# Patient Record
(demographics unavailable — no encounter records)

---

## 2017-10-01 NOTE — RAD
HISTORY: Back pain



COMPARISONS: None



VIEWS: 5 , Frontal, lateral, coned-down lateral sacral, and bilateral oblique views of the

lumbar spine.



FINDINGS: 



ALIGNMENT:  There is mild dextroscoliotic curvature of the spine. There is straightening

of the lumbar lordosis.

VERTEBRAL BODIES:  The vertebral body heights are normal. The interpedicular distances are

normal. There is multilevel anterolateral marginal osteophyte formation.

JOINTS:  There is diffuse facet osteoarthritic change, most pronounced along the lower

lumbar spine

INTERVERTEBRAL DISCS:  There is diffuse loss of intervertebral disc height.

SOFT TISSUE: Unremarkable.



OTHER:  The pelvis is unremarkable. The lung bases are clear.



IMPRESSION: 

DEGENERATIVE DISC DISEASE AND OSTEOARTHRITIS.

## 2017-10-01 NOTE — ED
Alfred FUCHS Thomas, scribed for Mary Jane Yepez MD on 10/01/17 at 0947 .





Back Pain





- HPI Summary


HPI Summary: 





The pt is a 66 y/o M presenting to the ED c/o sharp left lower back pain that 

began 9 days ago when he was doing mulch. The pain is described as a tightness, 

stiffness, and soreness. The pt rates the pain 2/10. The pain is alleviated by 

movement. The pain is aggravated by nothing. The patient has treated the pain 

with Alleve PTA. Two days ago, the pain worsened and the patient had difficulty 

standing up and ambulating. The pain now radiates down his LLE below his calf. 

He saw his PMD who thought that the patient aggravated a tendon in his pelvic 

bone. He prescribed the patient Naproxen 500mg BID. He reports previous back 

injuries. Two years ago, he had some sort of imaging on his back. Pt denies 

bladder or bowel incontinence. He is accompanied by his wife. PMHx of HTN, HLD, 

and hypothyroidism. He is on Simvastatin 20mg, Lisinopril 20mg, Synthroid 50mg, 

and ASA 81. He also has a Hx of kidney stones, although he says his current 

symptoms does not align with his prior sxs with kidney stones. PSHx of knee 

repairs and ESWL. FHx of cancer and Parkinsons disease. 





- History of Current Complaint


Chief Complaint: EDBackInjuryPain


Stated Complaint: BACK PAIN


Time Seen by Provider: 10/01/17 06:28


Hx Obtained From: Patient, Family/Caretaker - accompanied by his wife


Onset/Duration: Lasting Days - onset of pain 9 days ago, Still Present, Worse 

Since - worse since two days ago


Onset/Duration: Started Days Ago, Atraumatic, Still Present


Timing: Constant


Back Pain Location: Is Discrete @ - left lower back


Severity Currently: Moderate


Pain Intensity: 2


Pain Scale Used: 0-10 Numeric


Character: Sharp


Aggravating Symptom(s): Movement


Alleviating Symptom(s): Nothing


Associated Signs And Symptoms: Negative: Bladder Incontinence, Bowel 

Incontinence


Related History: Previous Back Injury





- Allergies/Home Medications


Allergies/Adverse Reactions: 


 Allergies











Allergy/AdvReac Type Severity Reaction Status Date / Time


 


No Known Allergies Allergy   Verified 05/13/16 08:09














PMH/Surg Hx/FS Hx/Imm Hx


Previously Healthy: No


Endocrine/Hematology History: Reports: Hx Thyroid Disease


   Denies: Hx Diabetes


Cardiovascular History: Reports: Hx Hypercholesterolemia, Hx Hypertension - ON 

MEDS


   Denies: Hx Pacemaker/ICD


 History: 


   Denies: Hx Renal Disease


Sensory History: 


   Denies: Hx Hearing Aid


Psychiatric History: 


   Denies: Hx Panic Disorder





- Surgical History


Surgery Procedure, Year, and Place: ARTHROSCOPIC RIGHT KNEE-REMOVED CYST 1991.  

KIDNEY STONES REMOVED SEVERAL TIMES.  TONSILS AS CHILD-BROKEN LEG AS CHILD NO 

METAL


Infectious Disease History: No


Infectious Disease History: 


   Denies: Traveled Outside the [place] in Last 30 Days





- Family History


Known Family History: Positive: Other - [place], parkinson's disease





- Social History


Lives: With Family


Alcohol Use: None


Hx Substance Use: No


Substance Use Type: Reports: None


Smoking Status (MU): Unknown if Ever Smoked





Review of Systems


Negative: Fever


Cardiovascular: Negative


Respiratory: Negative


Gastrointestinal: Negative


Positive: no symptoms reported


Positive: Other - Sharp left lower back pain onset 9 days ago


Skin: Negative


Neurological: Other - NEGATIVE: bladder or bowel incontinence


Psychological: Normal


All Other Systems Reviewed And Are Negative: Yes





Physical Exam


Triage Information Reviewed: Yes


Vital Signs On Initial Exam: 


 Initial Vitals











Temp Pulse Resp BP Pulse Ox


 


 97.2 F   104   20   143/94   98 


 


 10/01/17 03:10  10/01/17 03:10  10/01/17 03:10  10/01/17 03:10  10/01/17 03:10











Vital Signs Reviewed: Yes


Appearance: Positive: Well-Appearing, Well-Nourished, Pain Distress


Skin: Positive: Warm, Skin Color Reflects Adequate Perfusion


Head/Face: Positive: Normal Head/Face Inspection


Eyes: Positive: Conjunctiva Clear


ENT: Positive: Normal ENT inspection


Neck: Positive: Supple


Respiratory/Lung Sounds: Positive: Clear to Auscultation, Breath Sounds Present

, Other - No respiratory distress


Cardiovascular: Positive: RRR, Pulses are Symmetrical in both Upper and Lower 

Extremities, Other - Brisk cap refill.  Negative: Murmur


Abdomen Description: Positive: Nontender, No Organomegaly, Soft.  Negative: CVA 

Tenderness (R), CVA Tenderness (L), Distended, Guarding, Pulsatile Mass


Bowel Sounds: Positive: Present


Male Genital Exam: Positive: normal genitalia


Musculoskeletal: Positive: Strength/ROM Intact, Other - He is tender in L4, L5 

down to the coccyx. Pain is reproducible on the sciatic notch and sacroilium.


Neurological: Positive: Sensory/Motor Intact, Alert, Oriented to Person Place, 

Time, Reflexes Intact, Facial Symmetry, Speech Normal, Other - Able to walk on 

heels and toes


Psychiatric: Positive: Normal





Diagnostics





- Vital Signs


 Vital Signs











  Temp Pulse Resp BP Pulse Ox


 


 10/01/17 08:49  97.9 F    


 


 10/01/17 03:10  97.2 F  104  20  143/94  98














- Laboratory


Lab Statement: Any lab studies that have been ordered have been reviewed, and 

results considered in the medical decision making process.





- Radiology


  ** L-Spine XR


Xray Interpretation: No Acute Changes - DDD and OA. ED physician has reviewed 

this report and agrees.


Radiology Interpretation Completed By: Radiologist





Re-Evaluation





- Re-Evaluation


  ** Second Eval


Re-Evaluation Time: 08:30 - reports relief of pain.  Able to bear weight.  

Wants to go home. 


Change: Improved





Back Pain Course/Dx





- Course


Course Of Treatment: High blood pressure noted.  Patients medications reviewed 

this visit.


Assessment/Plan: The pt is a 66 y/o M presenting to the ED c/o sharp left lower 

back pain that began 9 days ago when he was doing mulch. The pain radiates down 

his LLE below his calf.  Patients medication reviewed this visit. Blood 

pressure noted. In the ED course the patient was given Flexeril and Norco. XR L-

Spine shows DDD and OA. ED physician has reviewed this report and agrees.  

Patient will be discharged home with follow up by PCP. Pt is agreeable with 

this plan.





- Diagnoses


Differential Diagnosis/HQI/PQRI: Positive: Arthritis, Herniated Disc, Strain, 

Sprain


Provider Diagnoses: 


 Acute low back pain with left-sided sciatica, Hypertension, poor control








Discharge





- Discharge Plan


Condition: Stable


Disposition: HOME


Prescriptions: 


Cyclobenzaprine TAB* [Flexeril 10 MG TAB*] 10 mg PO TID #30 tab


HYDROcodone/ACETAMIN 5-325 MG* [Norco 5-325 TAB*] 1 tab PO Q4H PRN #18 tab MDD 6


 PRN Reason: Pain


Patient Education Materials:  Sciatica (ED), Lumbar Radiculopathy (ED)


Referrals: 


Dev Gutierrez MD [Primary Care Provider] - 





The documentation as recorded by the Alfred carrizales Thomas accurately reflects 

the service I personally performed and the decisions made by me, Mary Jane Yepez MD.

## 2017-10-04 NOTE — ED
Back Pain





- HPI Summary


HPI Summary: 





65 male presents to ED with complaints of lower left sided back pain that began 

2 weeks ago after hurting himself while lifting and doing mulch. Patient states 

pain hurt worse last Friday 9/29/17 and seen in ED on [place] 10/1. Patient was 

taking naproxen 500mg BID and additionally given hydrocodone and flexeril when 

seen on Sunday. States he has not had any relief and has been crawling around, 

unable to walk and straighten leg without pain. Comfortable when resting and 

having leg externally rotated. Worse with weight bearing and walking. States 

pain starts in left gluteal/low back region and travels down left leg. 

Described as sharp and shooting at its worst. States "it feels like a 

rubberband that needs to be stretched but can't". Denies bruising, swelling and 

obvious deformity. No additional trauma or injury, states when he woke up last 

Friday it just hurt worse when he went to step off bed. Unable to make it to 

his PCP appointment today due to being unable to move/walk. Denies saddle 

anesthesia, bladder/bowel incontinence. Does admit to having some weakness of 

left leg. No abdominal pain, chest pain or trouble breathing. Has HTN, high 

cholesterol and thyroid disease that he currently takes medication for. Had 

xrays at last visit sunday that were unremarkable for acute injury. 





- History of Current Complaint


Chief Complaint: EDBackInjuryPain


Stated Complaint: BACK INJURY


Time Seen by Provider: 10/04/17 19:36


Hx Obtained From: Patient


Onset/Duration: Sudden Onset, Lasting Weeks, Still Present, Worse Since


Onset/Duration: Started Weeks Ago, Traumatic


Timing: Constant - with weight bearing or straightening of left LE


Back Pain Location: Is Discrete @ - left lumbar back /glute down left LE


Severity Initially: Moderate


Severity Currently: Severe


Pain Intensity: 8


Pain Scale Used: 0-10 Numeric


Aggravating Symptom(s): Movement, Walking


Alleviating Symptom(s): Rest, Position


Associated Signs And Symptoms: Positive: Weakness, Pain with Weight Bearing.  

Negative: Swelling, Redness, Bruising, Numbness, Tingling, Abdominal Pain, 

Bladder Incontinence, Bowel Incontinence





- Risk Factors


AAA Risk Factors: Hypertension, Atherosclerosis


TAD Risk Factors: Hypertension


Cauda Equina Risk Factors: Lower Extremity Weakness - left side


Epidural Abscess Risk Factors: Negative





- Allergies/Home Medications


Allergies/Adverse Reactions: 


 Allergies











Allergy/AdvReac Type Severity Reaction Status Date / Time


 


No Known Allergies Allergy   Verified 10/04/17 21:33











Home Medications: 


 Home Medications





Aspirin [Aspirin 81 MG TAB] 81 mg PO QPM 10/05/17 [History Confirmed 10/05/17]


Levothyroxine TAB* [Synthroid TAB*] 50 mcg PO QPM 10/05/17 [History Confirmed 10

/05/17]


Lisinopril TAB* [Prinivil TAB*] 20 mg PO DAILY 10/05/17 [History Confirmed 10/05

/17]


Simvastatin [Zocor 40 MG (NF)] 20 mg PO QPM 10/05/17 [History Confirmed 10/05/17

]











PMH/Surg Hx/FS Hx/Imm Hx


Endocrine/Hematology History: Reports: Hx Thyroid Disease


   Denies: Hx Diabetes


Cardiovascular History: Reports: Hx Hypercholesterolemia, Hx Hypertension - ON 

MEDS


   Denies: Hx Pacemaker/ICD


 History: 


   Denies: Hx Renal Disease


Sensory History: 


   Denies: Hx Hearing Aid


Psychiatric History: 


   Denies: Hx Panic Disorder





- Surgical History


Surgery Procedure, Year, and Place: ARTHROSCOPIC RIGHT KNEE-REMOVED CYST 1991.  

KIDNEY STONES REMOVED SEVERAL TIMES.  TONSILS AS CHILD-BROKEN LEG AS CHILD NO 

METAL





- Immunization History


Immunizations Up to Date: Yes


Infectious Disease History: No


Infectious Disease History: 


   Denies: Traveled Outside the [place] in Last 30 Days





- Family History


Known Family History: Positive: Other - [place], parkinson's disease





- Social History


Alcohol Use: None


Hx Substance Use: No


Substance Use Type: Reports: None


Smoking Status (MU): Unknown if Ever Smoked





Review of Systems


Constitutional: Negative


Cardiovascular: Negative


Respiratory: Negative


Positive: Arthralgia - lumbar back , Myalgia, Decreased ROM - left LE


Skin: Negative


Positive: Weakness - left LE


All Other Systems Reviewed And Are Negative: Yes





Physical Exam


Triage Information Reviewed: Yes


Vital Signs On Initial Exam: 


 Initial Vitals











Temp Pulse Resp BP Pulse Ox


 


 98 F   75   20   156/85   98 


 


 10/04/17 20:00  10/04/17 20:00  10/04/17 20:00  10/04/17 20:00  10/04/17 20:00








BP noted, patient in pain and diagnosed with HTN. recommend follow up with PCP 

for recheck.


Vital Signs Reviewed: Yes


Appearance: Positive: Well-Appearing, No Pain Distress - when sitting 

comfortable on stretcher, Well-Nourished


Skin: Positive: Warm, Skin Color Reflects Adequate Perfusion, Dry.  Negative: 

Cold, Numb, Cyanosis @, Pale, Erythema @


Head/Face: Positive: Normal Head/Face Inspection


Eyes: Positive: Conjunctiva Clear


ENT: Positive: Hearing grossly normal


Neck: Positive: Supple, Nontender


Respiratory/Lung Sounds: Positive: Clear to Auscultation, Breath Sounds 

Present.  Negative: Rales, Rhonchi, Wheezes


Cardiovascular: Positive: Normal, RRR, Pulses are Symmetrical in both Upper and 

Lower Extremities - 2+ pedal b/l.  Negative: Murmur, Rub


Abdomen Description: Positive: Nontender, Soft


Bowel Sounds: Positive: Present


Musculoskeletal: Positive: Limited @ - left LE due to pain with extension and 

weight bearing, Pain @ - left lumbar paraspinal muscle/gluteal region down left 

leg with movement and weight bearing. + straight leg raise, Other - no crepitus 

step off or obvious deformity. unable to straighten left LE without 

excrutiating discomfort.  Negative: Edema Left, Edema Right


Neurological: Positive: Normal, Sensory/Motor Intact - normal, Alert, Oriented 

to Person Place, Time, Reflexes Intact, NV Bundle Intact Distally, Unable to 

Assess Gait - due to pain





- [place] Coma Scale


Coma Scale Total: 15





Diagnostics





- Vital Signs


 Vital Signs











  Temp Pulse Resp BP Pulse Ox


 


 10/04/17 20:00  98 F  75  20  156/85  98














- Laboratory


Lab Statement: Any lab studies that have been ordered have been reviewed, and 

results considered in the medical decision making process.





Re-Evaluation





- Re-Evaluation


  ** First Eval


Re-Evaluation Time: 00:00


Change: Improved - had some relief after medication administered. was still 

unable to ambulate. discussed option of admission.





Back Pain Course/Dx





- Course


Course Of Treatment: given toradol, oxycodone and dexamethasone to attempt 

relief. did not repeat any imaging. had some relief however was unable to 

ambulate and move without excrutiating pain. attempted to use crutches however 

was still unable. Spoke with Dr Freeman about observation for pain control and 

possible consult. Patient preferred admission at this time due to being unable 

to function at home and take care of himself. Unable to get to outpatien doctor'

s appointments. Dr Freeman consulted and agreed to admit at this time. Told 

oxycodone and steroid were sent to pharmacy. recommended to follow up with 

neurosurgery for further evaluation and imaging.  No concern for emergent 

etiology such as abscess or cauda equina syndrome at this time. Appears to be 

suffering from a sciatica +/- muscular/neuro injury.





- Diagnoses


Differential Diagnosis/HQI/PQRI: Positive: Herniated Disc, Strain, Sprain, 

Other - sciatica


Provider Diagnoses: 


 Back pain with left-sided sciatica








- Provider Notifications


Discussed Care Of Patient With: Dr Padron, Dr Freeman


Time Discussed With Above Provider: 00:00


Instructed by Provider To: Admit As Observation





Discharge





- Discharge Plan


Condition: Stable


Disposition: ADMITTED TO [facility]


Prescriptions: 


oxyCODONE TAB* [Roxycodone TAB 5 mg*] 5 mg PO Q6H PRN #21 tab MDD 3


 PRN Reason: Pain


predniSONE TAB* [Deltasone TAB*] 40 mg PO DAILY #10 tab


Patient Education Materials:  Sciatica (ED), Lumbar Radiculopathy (ED)


Referrals: 


Dev Gutierrez MD [Primary Care Provider] - 


Dread Zelaya MD [Medical Doctor] -

## 2017-10-05 NOTE — HP
ADMISSION HISTORY AND PHYSICAL:

 

DATE OF ADMISSION:  10/05/17

 

PRIMARY CARE PROVIDER:  Dr. Gutierrez.

 

HEALTHCARE PROXY:  His wife.

 

CODE STATUS:  Full.

 

SOURCE OF INFORMATION:  History obtained from interview with the patient and 
his wife.

 

RELIABILITY:  Good.

 

CHIEF COMPLAINT:  Back and leg pain.

 

HISTORY OF PRESENT ILLNESS:  This is a 65-year-old man who had been in his 
usual state of health until approximately 2 weeks prior to presentation noticed 
pain in his lower back while moving mulch from his truck.  He was seen by Dr. Lujan, Dr. Gutierrez' partner and was prescribed naproxen with some relief; however
, awoke approximately 1 week ago with increasing pain and tightness in his left 
leg.  He was seen in the emergency room on the 1st and was prescribed 
hydrocodone as well as Flexeril in addition to the continued naproxen.  He has 
had minimal relief and as noted he has been unable to leave the house and 
making to the doctor's appointment secondary to increased pain in his leg.  He 
notes that the pain feels like a tightness like there as a rubber band in his 
leg extending from his left hamstring down to his foot with minimal pain in his 
left lower back that is largely improved at the time of the injury.  He feels 
the pain is relieved if he keeps his left hip externally rotated with his leg, 
flexed at the knee.  He feels the pain is worse with a straighten leg, trying 
to lay flat or with any application of pressure-like standing up.  Because of 
the continued pain and the inability to use this leg, he proceeded to the 
emergency room today where he was evaluated and the hospitalist service was 
consulted for admission.

 

PAST MEDICAL HISTORY:  Hypertension, hypothyroidism, hyperlipidemia, cyst 
removed from his left knee, recurrent nephrolithiasis, and tonsillectomy.

 

HOME MEDICATIONS:

1.  Aspirin 81 mg daily.

2.  Levothyroxine 50 mcg daily.

3.  Lisinopril 20 mg daily.

4.  Simvastatin 20 mg daily.

 

ALLERGIES:  No known drug allergies.

 

FAMILY HISTORY:  Positive for Parkinson's disease.

 

SOCIAL HISTORY:  No alcohol, illicits, or tobacco.

 

REVIEW OF SYSTEMS:  Negative for asymmetric weakness, paresthesias, urinary 
incontinence or retention, or stool incontinence.

 

                               PHYSICAL EXAMINATION

 

GENERAL:  Sitting up in bed, interactive, pleasant, in no apparent distress.

 

VITAL SIGNS:  In the emergency room, blood pressure 156/85, heart rate 75, 
respiratory rate 20, T-max 98 Fahrenheit, 98% on room air.

 

HEENT:  Oropharynx is clear.  He has moist mucous membranes.  Sclerae are 
anicteric.

 

LUNGS:  Clear to auscultation.

 

HEART:  He has regular rate and rhythm.  No murmurs, rubs, or gallops.

 

ABDOMEN:  Soft, nontender, and nondistended.

 

EXTREMITIES:  Warm and well perfused.  He is sitting straight up with his left 
leg externally rotated at the hip and flexed to me.  He experiences pain with 
extension of his leg.  There is no tenderness to palpation in his leg, nowhere 
along the spine or paraspinal muscles.  Gait not assessed.

 

NEUROLOGIC:  He is alert and oriented x3.  His cranial nerves are intact. 
Sensation is intact throughout.  He has downgoing Babinski's.  Reflex is 
symmetric.

 

 ASSESSMENT AND PLAN:  This is a 65-year-old man experienced lower back pain 
approximately 2 weeks prior while lifting mulch, now presenting with increasing 
pain in his leg, limiting his ability to function at home, ambulate or leave 
the home will get to the doctor.

 

1.  Lower back/left leg pain.  On my examination, this seems less like lower 
back pain and more muscular, localizing to his hamstring potentially while 
improves with flexion.  I think he needs additional imaging and/or evaluation 
by orthopedics to confirm or deny this muscular injury.  I placed a 
consultation for orthopedics who can direct us towards additional imaging.  For 
now, we will continue with current regimen of pain medication as was used at 
home.  Physical Therapy consultation is also placed.

2.  Hypertension.  Continue home medications.

3.  Hypothyroidism.  Continue home medication.

4.  DVT prophylaxis.  Azeb.

 

[id number]/[facility] #: [id number]

[facility]

## 2017-10-05 NOTE — PN
Subjective


Date of Service: 10/05/17


Interval History: 





Patient seen and examined at bedside.  Patient states he was able to take a few 

steps with the walker with physical therapy.  The patient states it is 

difficult for him to lie flat.  He has not had a BM since Sunday.


Family History: Unchanged from Admission


Social History: Unchanged from Admission


Past Medical History: Unchanged from Admission





Objective


Active Medications: 








Hydrocodone Bitart/Acetaminophen (Norco 5-325 Tab*)  1 tab PO Q4H PRN


Aspirin (Aspirin Ec Low Dose*)  81 mg PO QPM SHAYLA


Atorvastatin Calcium (Lipitor*)  10 mg PO QPM SHAYLA


Cyclobenzaprine HCl (Flexeril Tab*)  10 mg PO TID SHAYLA


Docusate Sodium (Colace Cap*)  100 mg PO BID SHAYLA


Enoxaparin Sodium (Lovenox(*))  40 mg SUBCUT 0600 SHAYLA


Ibuprofen (Motrin Tab*)  600 mg PO Q8H SHAYLA


Levothyroxine Sodium (Synthroid Tab*)  50 mcg PO 0600 SHAYLA


Lisinopril (Prinivil Tab*)  20 mg PO DAILY SHAYLA


Magnesium Citrate (Citrate Of Magnesia*)  150 ml PO ONCE ONE


Magnesium Hydroxide (Milk Of Magnesia Liq*)  30 ml PO ONCE ONE


Ondansetron HCl (Zofran Inj*)  4 mg IV Q4H PRN


Polyethylene Glycol/Electrolytes (Miralax*)  17 gm PO DAILY SHAYLA


Senna (Senokot Tab*)  2 tab PO BEDTIME SHAYLA








 Vital Signs








  10/05/17 10/05/17 10/05/17





  07:40 11:02 11:03


 


Temperature 97.5 F  


 


Pulse Rate 108  


 


Respiratory 18 16 18





Rate   


 


Blood Pressure 136/93  





(mmHg)   


 


O2 Sat by Pulse 95  





Oximetry   











Oxygen Devices in Use Now: None


Appearance: sitting up in bed, NAD


Eyes: No Scleral Icterus - in


Ears/Nose/Mouth/Throat: NL Teeth, Lips, Gums


Neck: NL Appearance and Movements; NL JVP


Respiratory: Symmetrical Chest Expansion and Respiratory Effort, Clear to 

Auscultation


Cardiovascular: NL Sounds; No Murmurs; No JVD


Abdominal: NL Sounds; No Tenderness; No Distention


Extremities: No Edema


Neurological: Alert and Oriented x 3, - - equal strength. limited mobility of L 

leg d/t pain.


Lines/Tubes/Other Access: Clean, Dry and Intact Peripheral IV


Nutrition: Taking PO's





Assess/Plan/Problems-Billing


Patient is a 66 y/o M who presented to the ER w/ worsening R leg pain and 

inability to ambulate.





- Patient Problems


(1) Pain of left lower extremity due to injury


Comment: Appreciate Ortho input. Plan for MRI of thigh to eval pulled 

hamstring. Start standing Ibuprofen and continue PRN Norco. Continue PT.   





(2) Constipation


Comment: Will start regular bowel regimen of miralax, senna, and colace.  Giv 

eone time MOM and Mg Citrate.   





(3) HTN (hypertension)


Comment: Controlled. Continue Lisinopril.   





(4) Hypothyroidism


Comment: Continue Synthroid.   





(5) DVT prophylaxis


Current Visit: Yes   Comment: Lovenox   





(6) Full code status





Status and Disposition: 





OBV for leg pain. Plan to discharge home when able to ambulate safely.

## 2017-10-05 NOTE — CONS
ORTHOPEDIC SURGERY CONSULTATION:

 

DATE OF CONSULT:  10/05/17

 

REQUESTING SERVICE:  Hospitalist.

 

REQUESTING PROVIDER:  Deacon Freeman MD

 

CHIEF COMPLAINT:  Left lower extremity pain.

 

HISTORY OF PRESENT ILLNESS:  Miguel is a very pleasant 65-year-old man, who was 
admitted to the hospital for left thigh pain overnight.  He was in his usual 
state of health and then about 2 weeks ago, he was moving mulch from his truck 
when he injured what he thought was his left hamstring.  He rested it for a few 
days and saw his PCP, who prescribed naproxen.  The pain gradually improved 
over the first week, so he started to resume his normal activities again.  He 
was playing golf and reinjured the exact spot in the posterior thigh; this was 
a few days ago.  He has been having trouble walking because of pain in the 
posterior thigh since that time. He came in to the emergency room last night 
and given his inability to ambulate safely, he was admitted to the hospital.  
He reports that the pain is daily, marked sharp and pulling.  It is worse with 
attempted extension of the knee and ambulation and it is improved with rest.  
There is some associated swelling, but no ecchymosis.

 

PAST MEDICAL HISTORY:  Hypertension, hypothyroidism, hyperlipidemia, 
tonsillectomy.

 

HOME MEDICATIONS:

1.  Aspirin 81.

2.  Levothyroxine 50.

3.  Lisinopril 20.

4.  Simvastatin 20.

 

ALLERGIES:  No known drug allergies.

 

FAMILY HISTORY:  Positive for Parkinson's.

 

SOCIAL HISTORY:  No alcohol, illicit's, or tobacco.

 

REVIEW OF SYSTEMS:  Negative for fever, recent visual changes, difficulty 
swallowing, chest pain, shortness of breath, abdominal pain, hematuria, easy 
bruising, diffuse weakness and lack of coordination, and diffuse rash.

 

PHYSICAL EXAM:  Constitutional:  His general appearance is healthy and nonseptic
, in no acute distress.  Cardiovascular:  Pulse examination demonstrates 
positive pedal pulses, brisk capillary refill.  There are no varicosities.  
Lymphatic:  No lymphadenopathy appreciated.  Skin:  Bilateral upper and lower 
extremity examination reveals no ulcerative lesions.  Psychiatric/Neurological:
  Appropriate affect.  Alert and oriented to person, place, and time.  There is 
no significant abnormality in coordination appreciated.  Normal reflex of deep 
tendon reflex in the affected extremity.  Musculoskeletal:  Gait analysis 
unable to be performed due to pain in the left lower extremity.  He has no 
tenderness to palpation in his lumbar spine or in the left gluteal region.  He 
does have tenderness at the ischial tuberosity and along the course of the 
hamstring muscle down to the insertion of the semimembranosus.  There is no 
significant swelling or hematoma palpated.  No ecchymosis, but again he is 
quite tender here.  He has full strength with knee extension and he is able to 
flex the knee but is very painful to him.  Passive knee extension is also 
painful to him.  He has full 5/5 strength with ankle dorsiflexion, plantar 
flexion, inversion, eversion, FHL extension and flexion. Sensation is intact to 
light touch throughout the left lower extremity and he has strong palpable 
pulses.

 

DIAGNOSTIC STUDIES:  Imaging:  None.

 

ASSESSMENT AND PLAN:  A 65-year-old man with left hamstring strain versus tear 
versus rupture.  He, his wife, and I had a long discussion about the diagnosis 
and treatment plan.  We will get an MRI to confirm the injury and extent of 
damage. The plan for the next few days will be to rest, ice, and use 
antiinflammatories to decrease the inflammation and help with his pain.  He 
will work with physical therapy on gait training so that he is safe and stable 
to go home.  Next week, I will have him follow up in the office and as long as 
pain has been improving, we will get him started with physical therapy for 
range of motion and then eventually strengthening and reconditioning of the 
hamstring.  We will follow up on the MRI and if anything else is shown on that, 
this plan could very well change.  All of his and his wife's questions were 
answered.

 

TIME SPENT:  I spent 50 minutes with Mr. Robins and his wife over half of 
which was spent in counseling and coordination of care.

 

 [id number]/[facility] #: [id number]

CARMELO

## 2017-10-06 NOTE — RAD
INDICATION: Thigh pain, inability to ambulate, evaluate for hamstring rupture.



COMPARISON:  There are no prior studies available for comparison.



TECHNIQUE: Axial, sagittal and coronal T1 and T2-weighted images of the left thigh were

obtained.



FINDINGS: The bones are in normal alignment and signal intensity. No fracture is seen. 



There is mild edema at the muscular tendinous junction of the semimembranosus tendon in

the upper to mid thigh suggestive of a tendon strain. No fluid collection or hematoma is

seen.



No mass or cyst is seen.



There is fluid around both testicles consistent with bilateral hydroceles.



IMPRESSION:  

1. MILD STRAIN OF THE SEMIMEMBRANOSUS TENDON AT THE MUSCULOTENDINOUS JUNCTION IN THE UPPER

TO MID THIGH.

2. BILATERAL HYDROCELES.

## 2017-10-06 NOTE — PN
Subjective


Date of Service: 10/06/17


Interval History: 


Patient seen and examined at bedside.  Patient still in too much pain to bear 

any weight on foot and ambulate with crutches.  He did not premedicate with 

Norco prior to participating with therapy.


Family History: Unchanged from Admission


Social History: Unchanged from Admission


Past Medical History: Unchanged from Admission





Objective


Active Medications: 








Hydrocodone Bitart/Acetaminophen (Norco 5-325 Tab*)  1 tab PO Q4H PRN


Hydrocodone Bitart/Acetaminophen (Norco 5-325 Tab*)  2 tab PO Q4H PRN


Aspirin (Aspirin Ec Low Dose*)  81 mg PO QPM SHAYLA


Atorvastatin Calcium (Lipitor*)  10 mg PO QPM SHAYLA


Cyclobenzaprine HCl (Flexeril Tab*)  10 mg PO TID SHAYLA


Docusate Sodium (Colace Cap*)  100 mg PO BID SHAYLA


Enoxaparin Sodium (Lovenox(*))  40 mg SUBCUT 0600 SHAYLA


Ibuprofen (Motrin Tab*)  600 mg PO Q8H SHAYLA


Levothyroxine Sodium (Synthroid Tab*)  50 mcg PO 0600 SHAYLA


Lisinopril (Prinivil Tab*)  20 mg PO DAILY SHAYLA


Ondansetron HCl (Zofran Inj*)  4 mg IV Q4H PRN


Polyethylene Glycol/Electrolytes (Miralax*)  17 gm PO DAILY SHAYLA


Senna (Senokot Tab*)  2 tab PO BEDTIME [facility]


























  10/06/17 10/06/17 10/06/17





  06:39 07:18 08:24


 


Temperature  97.3 F 


 


Pulse Rate  72 


 


Respiratory 18 16 18





Rate   


 


Blood Pressure  132/89 





(mmHg)   


 


O2 Sat by Pulse  95 





Oximetry   














Oxygen Devices in Use Now: None


Appearance: sitting up in bed, NAD


Eyes: No Scleral Icterus, PERRLA


Ears/Nose/Mouth/Throat: NL Teeth, Lips, Gums


Neck: NL Appearance and Movements; NL JVP


Respiratory: Symmetrical Chest Expansion and Respiratory Effort, Clear to 

Auscultation


Cardiovascular: NL Sounds; No Murmurs; No JVD, RRR


Abdominal: NL Sounds; No Tenderness; No Distention


Extremities: No Edema


Skin: No Rash or Ulcers


Neurological: Alert and Oriented x 3


Lines/Tubes/Other Access: Clean, Dry and Intact Peripheral IV


Nutrition: Taking PO's


Result Diagrams: 


 10/06/17 06:13





 10/06/17 06:13





Assess/Plan/Problems-Billing


Patient is a 64 y/o M who presented to the ER w/ worsening R leg pain and 

inability to ambulate.





- Patient Problems


(1) Pain of left lower extremity due to injury


Comment: Appreciate Ortho input. MRI shows mild tendon strain.  Continue 

aggressive pain control with Flexeril, Ibuprofen, and Norco.  PT as able.   





(2) Constipation


Comment: Continue regular bowel regimen of miralax, senna, and colace.     





(3) HTN (hypertension)


Comment: Controlled. Continue Lisinopril.   





(4) Hypothyroidism


Comment: Continue Synthroid.   





(5) DVT prophylaxis


Current Visit: Yes   Comment: Lovenox   





(6) Full code status





Status and Disposition: 





OBV for leg pain. Plan to discharge home when able to ambulate safely.

## 2017-10-08 NOTE — DS
CC:  Dr. Gutierrez; Dr. Fausto Pepe

 

DISCHARGE SUMMARY:

 

DATE OF ADMISSION:  10/05/17

 

DATE OF DISCHARGE:  10/07/17

 

PRIMARY CARE PROVIDER:  Dr. Gutierrez.

 

CONSULTING ORTHOPEDIST:  Dr. Fausto Pepe.

 

DISCHARGE DIAGNOSIS:  Left semimembranosus tendon strain.

 

SECONDARY DIAGNOSES:

1.  Hypertension.

2.  Hypothyroidism.

3.  Hyperlipidemia.

4.  Recurrent nephrolithiasis.

5.  Status post tonsillectomy.

 

MEDICATION LIST:

1.  Levothyroxine 50 mcg p.o. daily.

2.  Aspirin 81 mg p.o. daily.

3.  Lisinopril 20 mg p.o. daily.

4.  Simvastatin 20 mg p.o. daily.

5.  Cyclobenzaprine 10 mg p.o. t.i.d.

6.  Senna 2 tablets p.o. bedtime.

7.  MiraLAX 17 g p.o. daily.

8.  Omeprazole 20 mg p.o. daily at 6 a.m.

9.  Ibuprofen 600 mg p.o. q. 8 hours.

10.  Hydrocodone/acetaminophen 5/325 mg 1 to 2 tablets p.o. q.6 hours as needed for pain, MDD 8 tabl
ets, dispensed 30, no refills.

 

HOSPITAL COURSE:  Mr. Robins is a 65-year-old male with past medical history stated above that p
resented to the emergency room with complaints of back and left leg pain.  The symptoms had started 
2 weeks prior to admission while he was moving mulch from his truck.  He was seen by his primary car
e provider, prescribed naproxen with no improvement.  Despite the pain, the patient also played a ro
und of golf and this exacerbated his symptoms.  He presented to the emergency room with complaints o
f pain in the posterior aspect of his left leg.  For more details about his presentation, I refer yo
u to his history and physical.

 

The patient was admitted to the medical floor for further evaluation and pain control.

 

He was seen in consultation by orthopedist (Dr. Pepe) and his impression was the patient likely 
had a left hamstring strain versus tear versus rupture.  His recommendation was for an MRI to confir
m the injury and extent of damage.  He recommended rest, ice, and antiinflammatories to decrease inf
lammation and help with his pain.  MRI showed mild strain of the semimembranosus tendon at the [facility] in the upper to mid thigh and also incidental finding of bilateral hydroceles.

 

The patient had improvement of his symptoms and he is able to ambulate with a walker at this point. 
 Dr. Pepe recommended Physical Therapy for gait training and he will follow up in the office and
 as long as the pain continues to improve, he will start Physical Therapy for range of motion, stren
gthening, and reconditioning of the hamstring.

 

The patient was seen by  and received offer for home services that they accepted.

 

He is medically stable to be discharged home today to follow up with Dr. Gutierrez and Dr. Pepe as 
outpatient.

 

DIET:  Regular diet.

 

ACTIVITY:  As tolerated.

 

DISPOSITION:  To home.

 

STATUS WHILE IN THE HOSPITAL:  Inpatient.

 

Please keep in mind this is a summarized version of this patient's hospital stay. If you need more i
nformation, please feel free to call me at [phone] or please obtain the full medical records.

 

TIME SPENT:  Approximately 45 minutes was spent to complete this discharge.

 

 [id number]/[facility] #: [id number]

## 2018-05-25 NOTE — ED
Baldo FUCHS Rebecca, scribed for Cyrus Mar MD on 05/25/18 at 1350 .





Abdominal Pain/Male





- HPI Summary


HPI Summary: 


Pt is a 64 y/o M who presents to ED c/o RLQ abdominal pain. Sx began last night 

at 1700 and is currently moderate, ranked 7/10. Oxycodone was taken at 110 

today. Pain does not radiate and sx are aggravated and alleviated by nothing. 

Additionally c/o nausea. Denies vomiting, fever, chills. Was seen by Dr. Andrews this morning because he believed the pain was similar to prior 

instances of kidney stones. At Dr. Andrews's office, an US of the kidneys was 

done which showed no hydronephrosis or kidney stones. He was referred to [facility] ED 

to r/o esmer. 








- History of Current Complaint


Chief Complaint: EDAbdPain


Stated Complaint: SENT BY DR ANGEL


Time Seen by Provider: 05/25/18 13:32


Hx Obtained From: Patient


Onset/Duration: Lasting Hours, Still Present


Severity Currently: Moderate


Pain Intensity: 7


Pain Scale Used: 0-10 Numeric


Location: Discrete At: RLQ


Radiates: No


Aggravating Factor(s): Nothing


Alleviating Factor(s): Nothing


Associated Signs And Symptoms: Positive: Nausea.  Negative: Fever, Vomiting





- Allergies/Home Medications


Allergies/Adverse Reactions: 


 Allergies











Allergy/AdvReac Type Severity Reaction Status Date / Time


 


No Known Allergies Allergy   Verified 10/04/17 21:33











Home Medications: 


 Home Medications





Aspirin EC TAB* [Ecotrin EC Low Dose 81 MG*] 81 mg PO DAILY 05/25/18 [History 

Confirmed 05/25/18]


Fluticasone NASAL SPRAY 50MCG* [Flonase NASAL SPRAY 50MCG*] 2 spray BOTH NARES 

DAILY 05/25/18 [History Confirmed 05/25/18]


Ibuprofen TAB* [Motrin TAB* 600 MG] 600 mg PO Q8H PRN 05/25/18 [History 

Confirmed 05/25/18]


Levothyroxine TAB* [Synthroid TAB*] 50 mcg PO QAM 05/25/18 [History Confirmed 05 /25/18]











PMH/Surg Hx/FS Hx/Imm Hx


Endocrine/Hematology History: Reports: Hx Thyroid Disease


   Denies: Hx Diabetes


Cardiovascular History: Reports: Hx Hypercholesterolemia, Hx Hypertension


   Denies: Hx Pacemaker/ICD


 History: Reports: Hx Kidney Stones


   Denies: Hx Renal Disease


Musculoskeletal History: Reports: Hx Back Problems


Sensory History: Reports: Hx Contacts or Glasses


   Denies: Hx Hearing Aid


Opthamlomology History: Reports: Hx Contacts or Glasses


Psychiatric History: 


   Denies: Hx Panic Disorder





- Surgical History


Surgery Procedure, Year, and Place: ARTHROSCOPIC RIGHT KNEE-REMOVED CYST 1991.  

KIDNEY STONES REMOVED SEVERAL TIMES.  TONSILS AS CHILD-BROKEN LEG AS CHILD NO 

METAL


Infectious Disease History: No


Infectious Disease History: 


   Denies: Traveled Outside the [place] in Last 30 Days





- Family History


Known Family History: Positive: Other - [place], parkinson's disease





- Social History


Alcohol Use: Rare


Hx Substance Use: No


Substance Use Type: Reports: None


Smoking Status (MU): Never Smoked Tobacco





Review of Systems


Negative: Fever, Chills


Positive: Abdominal Pain - RLQ, Nausea.  Negative: Vomiting


All Other Systems Reviewed And Are Negative: Yes





Physical Exam





- Summary


Physical Exam Summary: 


VITAL SIGNS: Reviewed. 


GENERAL: Patient is a well-developed and nourished male who is lying 

comfortable in the stretcher. ~Patient is not in any acute respiratory 

distress. 


HEAD AND FACE: Normocephalic and atraumatic. 


EYES: PERRLA, EOMI x 2, No injected conjunctiva.


EARS: Hearing grossly intact. Ear canals and tympanic membranes are WNL.


MOUTH: Oropharynx within normal limits. 


NECK: Supple, trachea is midline, no adenopathy, no JVD.


CHEST: Symmetric, no tenderness at palpation 


LUNGS: Clear to auscultation bilaterally. No wheezing or crackles.


CVS: RRR, S1 and S2 present, no murmurs or gallops appreciated. 


ABDOMEN: Soft, RLQ tenderness. No signs of distention. Positive bowel sounds. 

No rebound no guarding, and no masses palpated. No abdominal bruit or 

pulsations. 


EXTREMITIES: FROM in all major joints, no edema, no cyanosis or clubbing.


NEURO: Alert and oriented x 3. No acute neurological deficits. Speech is normal.


SKIN: Dry and warm











Triage Information Reviewed: Yes


Vital Signs On Initial Exam: 


 Initial Vitals











Temp Pulse Resp BP Pulse Ox


 


 98.5 F   74   16   179/112   97 


 


 05/25/18 13:17  05/25/18 13:17  05/25/18 13:17  05/25/18 13:17  05/25/18 13:17











Vital Signs Reviewed: Yes





Diagnostics





- Vital Signs


 Vital Signs











  Temp Pulse Resp BP Pulse Ox


 


 05/25/18 13:17  98.5 F  74  16  179/112  97














- Laboratory


Lab Results: 


 Lab Results











  05/25/18 05/25/18 05/25/18 Range/Units





  13:38 13:38 13:38 


 


WBC  10.8    (3.5-10.8)  10^3/ul


 


RBC  5.34    (4.0-5.4)  10^6/ul


 


Hgb  16.7    (14.0-18.0)  g/dl


 


Hct  49    (42-52)  %


 


MCV  91    (80-94)  fL


 


MCH  31    (27-31)  pg


 


MCHC  34    (31-36)  g/dl


 


RDW  14    (10.5-15)  %


 


Plt Count  237    (150-450)  10^3/ul


 


MPV  7.9    (7.4-10.4)  um3


 


Neut % (Auto)  75.1    (38-83)  %


 


Lymph % (Auto)  16.6 L    (25-47)  %


 


Mono % (Auto)  7.2 H    (0-7)  %


 


Eos % (Auto)  0.5    (0-6)  %


 


Baso % (Auto)  0.6    (0-2)  %


 


Absolute Neuts (auto)  8.1 H    (1.5-7.7)  10^3/ul


 


Absolute Lymphs (auto)  1.8    (1.0-4.8)  10^3/ul


 


Absolute Monos (auto)  0.8    (0-0.8)  10^3/ul


 


Absolute Eos (auto)  0.1    (0-0.6)  10^3/ul


 


Absolute Basos (auto)  0.1    (0-0.2)  10^3/ul


 


Absolute Nucleated RBC  0    10^3/ul


 


Nucleated RBC %  0    


 


Sodium   134 L   (139-145)  mmol/L


 


Potassium   4.6   (3.5-5.0)  mmol/L


 


Chloride   99 L   (101-111)  mmol/L


 


Carbon Dioxide   27   (22-32)  mmol/L


 


Anion Gap   8   (2-11)  mmol/L


 


BUN   16   (6-24)  mg/dL


 


Creatinine   0.95   (0.67-1.17)  mg/dL


 


Est GFR ( Amer)   102.3   (>60)  


 


Est GFR (Non-Af Amer)   79.6   (>60)  


 


BUN/Creatinine Ratio   16.8   (8-20)  


 


Glucose   106 H   ()  mg/dL


 


Lactic Acid    0.9  (0.5-2.0)  mmol/L


 


Calcium   9.6   (8.6-10.3)  mg/dL


 


Magnesium   2.0   (1.9-2.7)  mg/dL


 


Total Bilirubin   0.60   (0.2-1.0)  mg/dL


 


AST   22   (13-39)  U/L


 


ALT   18   (7-52)  U/L


 


Alkaline Phosphatase   38   ()  U/L


 


Total Creatine Kinase   118   ()  U/L


 


C-Reactive Protein   1.25   (< 5.00)  mg/L


 


Total Protein   7.8   (6.4-8.9)  g/dL


 


Albumin   4.6   (3.2-5.2)  g/dL


 


Globulin   3.2   (2-4)  g/dL


 


Albumin/Globulin Ratio   1.4   (1-3)  


 


Lipase   15   (11.0-82.0)  U/L


 


Urine Color     


 


Urine Appearance     


 


Urine pH     (5-9)  


 


Ur Specific Gravity     (1.010-1.030)  


 


Urine Protein     (Negative)  


 


Urine Ketones     (Negative)  


 


Urine Blood     (Negative)  


 


Urine Nitrate     (Negative)  


 


Urine Bilirubin     (Negative)  


 


Urine Urobilinogen     (Negative)  


 


Ur Leukocyte Esterase     (Negative)  


 


Urine WBC (Auto)     (Absent)  


 


Urine RBC (Auto)     (Absent)  


 


Ur Squamous Epith Cells     (Absent)  


 


Urine Bacteria     (Absent)  


 


Urine Glucose     (Negative)  














  05/25/18 Range/Units





  15:22 


 


WBC   (3.5-10.8)  10^3/ul


 


RBC   (4.0-5.4)  10^6/ul


 


Hgb   (14.0-18.0)  g/dl


 


Hct   (42-52)  %


 


MCV   (80-94)  fL


 


MCH   (27-31)  pg


 


MCHC   (31-36)  g/dl


 


RDW   (10.5-15)  %


 


Plt Count   (150-450)  10^3/ul


 


MPV   (7.4-10.4)  um3


 


Neut % (Auto)   (38-83)  %


 


Lymph % (Auto)   (25-47)  %


 


Mono % (Auto)   (0-7)  %


 


Eos % (Auto)   (0-6)  %


 


Baso % (Auto)   (0-2)  %


 


Absolute Neuts (auto)   (1.5-7.7)  10^3/ul


 


Absolute Lymphs (auto)   (1.0-4.8)  10^3/ul


 


Absolute Monos (auto)   (0-0.8)  10^3/ul


 


Absolute Eos (auto)   (0-0.6)  10^3/ul


 


Absolute Basos (auto)   (0-0.2)  10^3/ul


 


Absolute Nucleated RBC   10^3/ul


 


Nucleated RBC %   


 


Sodium   (139-145)  mmol/L


 


Potassium   (3.5-5.0)  mmol/L


 


Chloride   (101-111)  mmol/L


 


Carbon Dioxide   (22-32)  mmol/L


 


Anion Gap   (2-11)  mmol/L


 


BUN   (6-24)  mg/dL


 


Creatinine   (0.67-1.17)  mg/dL


 


Est GFR ( Amer)   (>60)  


 


Est GFR (Non-Af Amer)   (>60)  


 


BUN/Creatinine Ratio   (8-20)  


 


Glucose   ()  mg/dL


 


Lactic Acid   (0.5-2.0)  mmol/L


 


Calcium   (8.6-10.3)  mg/dL


 


Magnesium   (1.9-2.7)  mg/dL


 


Total Bilirubin   (0.2-1.0)  mg/dL


 


AST   (13-39)  U/L


 


ALT   (7-52)  U/L


 


Alkaline Phosphatase   ()  U/L


 


Total Creatine Kinase   ()  U/L


 


C-Reactive Protein   (< 5.00)  mg/L


 


Total Protein   (6.4-8.9)  g/dL


 


Albumin   (3.2-5.2)  g/dL


 


Globulin   (2-4)  g/dL


 


Albumin/Globulin Ratio   (1-3)  


 


Lipase   (11.0-82.0)  U/L


 


Urine Color  Yellow  


 


Urine Appearance  Clear  


 


Urine pH  6.0  (5-9)  


 


Ur Specific Gravity  1.015  (1.010-1.030)  


 


Urine Protein  Negative  (Negative)  


 


Urine Ketones  1+ A  (Negative)  


 


Urine Blood  Negative  (Negative)  


 


Urine Nitrate  Negative  (Negative)  


 


Urine Bilirubin  Negative  (Negative)  


 


Urine Urobilinogen  Negative  (Negative)  


 


Ur Leukocyte Esterase  Negative  (Negative)  


 


Urine WBC (Auto)  Trace(0-5/hpf)  (Absent)  


 


Urine RBC (Auto)  Absent  (Absent)  


 


Ur Squamous Epith Cells  Present A  (Absent)  


 


Urine Bacteria  Absent  (Absent)  


 


Urine Glucose  Negative  (Negative)  











Result Diagrams: 


 05/25/18 13:38





 05/25/18 13:38


Lab Statement: Any lab studies that have been ordered have been reviewed, and 

results considered in the medical decision making process.





- CT


  ** CT Abd/Pel


CT Interpretation Completed By: Radiologist - 1. Normal appendix documented. 2. 

Mild diverticulosis of the sigmoid colon without findings of diverticulitis. 3. 

Negative for obstructive uropathy. ED physician reviewed this radiology report.





- EKG


  ** 1349


Cardiac Rate: NL - 71 bpm


EKG Rhythm: Sinus Rhythm


EKG Interpretation: No ST elevations, normal axis. 





Abdominal Pain Fem Course/Dx





- Course


Assessment/Plan: This patient is a 65-year-old male who presents to the 

emergency department with a chief complaint of having right lower quadrant 

pain.  The patient has seen Dr. Andrews from urology where he had ultrasounds 

and that was similar reports that there was no kidney stones.  She thinks that 

the patient has an plan acute appendicitis.  EKG shows a sinus rhythm at 71 bpm 

with no abnormalities.  No ST elevations.  Abdominopelvic CT impression: Fatty 

infiltration of the liver.  Normal appendix documented.  Negative for 

obstructive uropathy.  In the ED course the patient was given Toradol for the 

pain.  Blood test results without any significant abnormality.  Abdominopelvic 

CT impression: Normal appendix documented.  My diverticulosis of the sigmoid 

colon without findings of diverticulitis.  Negative for obstructive uropathy.  

In the ED course the patient was given Toradol for the pain and the symptoms 

improved.  Therefore, the patient was discharged home with follow with primary 

care physician as needed.  I discussed all the findings and test results with 

the patient. Patient was instructed to return to the emergency room immediately 

if any of the symptoms return or worsens. Plan of care was discussed with the 

patient and understands and agrees.





- Diagnoses


Differential Diagnosis/HQI/PQRI: Appendicitis, Bowel Obstruction, Constipation, 

Renal Colic, Testicular Torsion


Provider Diagnoses: 


 Right lower quadrant pain








Discharge





- Sign-Out/Discharge


Documenting (check all that apply): Discharge/Admit/Transfer





- Discharge Plan


Condition: Stable


Disposition: HOME


Referrals: 


Dev Gutierrez MD [Primary Care Provider] - 





- Billing Disposition and Condition


Condition: STABLE


Disposition: HOME





The documentation as recorded by the Baldo carrizales Rebecca accurately 

reflects the service I personally performed and the decisions made by me, Cyrus Mar MD.

## 2018-05-25 NOTE — RAD
INDICATION: Abdominal pain. Assess for appendicitis.



COMPARISON: July 31, 2010 CT



TECHNIQUE: Multidetector CT images were obtained from the lung bases to the ischial

tuberosities with 105 mL Omnipaque 300 IV and oral contrast.  Multiplanar reformation.



REPORT: Unremarkable visualized inferior thorax.



Unremarkable liver, gallbladder, pancreas, spleen.



No CT abnormality of the upper GI or small bowel. Normal appendix visualized posterior and

inferior to the cecum. Negative for periappendiceal inflammatory change. Mild

diverticulosis of the sigmoid colon without findings of diverticulitis. Negative for

ascites, free air, or significant hernias.



Normal adrenal glands. Few small renal cortical cysts. No suspicious focal renal lesions

or hydronephrosis. Symmetric nephrograms and pyelograms. Unremarkable nondilated ureters

and urinary bladder. Symmetric seminal vesicles. Coarse calcification at the prostate.



Negative for lymphadenopathy. Normal diameter abdominal aorta and iliac arteries with mild

atherosclerotic plaque. Physiologic distention of the IVC.



Small osseous hemangiomas at the lumbar sacral spine. Lumbar sacral spine degenerative

spondylosis and facet joint osteoarthritis with progression compared with the 2010 exam..

Negative for suspicious focal osseous lesions.



IMPRESSION: 

1. Normal appendix documented.

2. Mild diverticulosis of the sigmoid colon without findings of diverticulitis.

3. Negative for obstructive uropathy.

## 2018-06-10 NOTE — ED
Abdominal Pain/Male





- HPI Summary


HPI Summary: 


Patient is a 65-year-old male who presents emergency department for a 2 week 

history of right lower quadrant abdominal pain.  Patient states pain starts to 

his right side and extends into his groin and occasionally into his leg.  

Patient was seen in our ER 2 weeks ago and had a CAT scan did not that time 

which was unremarkable.  His pain persisted and PCP ordered a pelvic MRI.  

Patient presents today because pain worsened and he was unable to sleep last 

night.  Pain is worse with sitting and better when standing and stretching.  

Associated symptoms of nausea.  Denies vomiting, fever, recent upper 

respiratory infection, urinary symptoms, diarrhea, constipation, chest pain, 

shortness of breath.  Symptoms are moderate in severity.  Past medical history 

of hypertension, high cholesterol, hypothyroidism.








- History of Current Complaint


Chief Complaint: EDAbdPain


Stated Complaint: ABD/FLANK PAIN


Time Seen by Provider: 06/10/18 08:59


Hx Obtained From: Patient, Family/Caretaker


Pain Intensity: 5


Pain Scale Used: 0-10 Numeric





- Allergies/Home Medications


Allergies/Adverse Reactions: 


 Allergies











Allergy/AdvReac Type Severity Reaction Status Date / Time


 


No Known Allergies Allergy   Verified 06/10/18 08:50














PMH/Surg Hx/FS Hx/Imm Hx


Previously Healthy: Yes


Endocrine/Hematology History: Reports: Hx Thyroid Disease


   Denies: Hx Diabetes


Cardiovascular History: Reports: Hx Hypercholesterolemia, Hx Hypertension


   Denies: Hx Pacemaker/ICD


 History: Reports: Hx Kidney Stones


   Denies: Hx Renal Disease


Musculoskeletal History: Reports: Hx Back Problems


Sensory History: Reports: Hx Contacts or Glasses


   Denies: Hx Hearing Aid


Opthamlomology History: Reports: Hx Contacts or Glasses


Psychiatric History: 


   Denies: Hx Panic Disorder





- Surgical History


Surgery Procedure, Year, and Place: ARTHROSCOPIC RIGHT KNEE-REMOVED CYST 1991.  

KIDNEY STONES REMOVED SEVERAL TIMES.  TONSILS AS CHILD-BROKEN LEG AS CHILD NO 

METAL


Infectious Disease History: No


Infectious Disease History: 


   Denies: Traveled Outside the [place] in Last 30 Days





- Family History


Known Family History: Positive: Other - [place], parkinson's disease





- Social History


Occupation: Retired


Lives: With Family


Alcohol Use: Rare


Hx Substance Use: No


Substance Use Type: Reports: None


Smoking Status (MU): Never Smoked Tobacco





Review of Systems


Constitutional: Negative


Negative: Fever, Chills


Eyes: Negative


ENT: Negative


Cardiovascular: Negative


Negative: Palpitations, Chest Pain


Respiratory: Negative


Negative: Shortness Of Breath, Cough


Positive: Abdominal Pain, Nausea.  Negative: Vomiting, Diarrhea


Genitourinary: Negative


Negative: Rash


Negative: Weakness, Paresthesia, Numbness


All Other Systems Reviewed And Are Negative: Yes





Physical Exam


Triage Information Reviewed: Yes


Vital Signs On Initial Exam: 


 Initial Vitals











Temp Pulse Resp BP Pulse Ox


 


 98 F   93   17   138/99   97 


 


 06/10/18 08:46  06/10/18 08:46  06/10/18 08:46  06/10/18 08:46  06/10/18 08:46











Vital Signs Reviewed: Yes


Appearance: Positive: Pain Distress - Pt. lying on left side in bed, appears in 

pain but nontoxic.  Wife present.


Skin: Positive: Warm, Dry


Head/Face: Positive: Normal Head/Face Inspection


Eyes: Positive: Normal


Neck: Positive: Supple


Respiratory/Lung Sounds: Positive: Clear to Auscultation, Breath Sounds Present


Cardiovascular: Positive: Normal, RRR


Abdomen Description: Positive: Other: - Abdomen is soft with mild diffuse 

tenderness.  Rebound tenderness or guarding.  No palpable mass noted.  No 

rigidity.  No CVA tenderness bilaterally.  No rash noted.


Neurological: Positive: Normal, CN Intact II-III


Psychiatric: Positive: Affect/Mood Appropriate





Diagnostics





- Vital Signs


 Vital Signs











  Temp Pulse Resp BP Pulse Ox


 


 06/10/18 12:22  98.6 F  95  17  146/95  99


 


 06/10/18 12:02   91   146/94  96


 


 06/10/18 12:00   94    96


 


 06/10/18 11:32   97   165/95  98


 


 06/10/18 11:02   89   150/102  97


 


 06/10/18 11:00   91    98


 


 06/10/18 10:32   78   146/92  96


 


 06/10/18 10:02   84   148/96  94


 


 06/10/18 10:00   91    95


 


 06/10/18 09:46   99   160/111  97


 


 06/10/18 09:43    18  


 


 06/10/18 09:01   96   176/114  98


 


 06/10/18 09:00   98    98


 


 06/10/18 08:59   95    99


 


 06/10/18 08:46  98 F  93  17  138/99  97














- Laboratory


Lab Results: 


 Lab Results











  06/10/18 06/10/18 06/10/18 Range/Units





  09:45 09:45 09:45 


 


WBC  7.0    (3.5-10.8)  10^3/ul


 


RBC  5.14    (4.0-5.4)  10^6/ul


 


Hgb  16.0    (14.0-18.0)  g/dl


 


Hct  47    (42-52)  %


 


MCV  92    (80-94)  fL


 


MCH  31    (27-31)  pg


 


MCHC  34    (31-36)  g/dl


 


RDW  14    (10.5-15)  %


 


Plt Count  222    (150-450)  10^3/ul


 


MPV  8.2    (7.4-10.4)  um3


 


Neut % (Auto)  68.1    (38-83)  %


 


Lymph % (Auto)  20.9 L    (25-47)  %


 


Mono % (Auto)  8.7 H    (0-7)  %


 


Eos % (Auto)  1.7    (0-6)  %


 


Baso % (Auto)  0.6    (0-2)  %


 


Absolute Neuts (auto)  4.7    (1.5-7.7)  10^3/ul


 


Absolute Lymphs (auto)  1.5    (1.0-4.8)  10^3/ul


 


Absolute Monos (auto)  0.6    (0-0.8)  10^3/ul


 


Absolute Eos (auto)  0.1    (0-0.6)  10^3/ul


 


Absolute Basos (auto)  0    (0-0.2)  10^3/ul


 


Absolute Nucleated RBC  0    10^3/ul


 


Nucleated RBC %  0.1    


 


Sodium   138 L   (139-145)  mmol/L


 


Potassium   4.2   (3.5-5.0)  mmol/L


 


Chloride   104   (101-111)  mmol/L


 


Carbon Dioxide   27   (22-32)  mmol/L


 


Anion Gap   7   (2-11)  mmol/L


 


BUN   14   (6-24)  mg/dL


 


Creatinine   0.92   (0.67-1.17)  mg/dL


 


Est GFR ( Amer)   106.2   (>60)  


 


Est GFR (Non-Af Amer)   82.6   (>60)  


 


BUN/Creatinine Ratio   15.2   (8-20)  


 


Glucose   110 H   ()  mg/dL


 


Lactic Acid    1.0  (0.5-2.0)  mmol/L


 


Calcium   9.8   (8.6-10.3)  mg/dL


 


Total Bilirubin   0.60   (0.2-1.0)  mg/dL


 


AST   25   (13-39)  U/L


 


ALT   19   (7-52)  U/L


 


Alkaline Phosphatase   42   ()  U/L


 


Total Protein   7.6   (6.4-8.9)  g/dL


 


Albumin   4.5   (3.2-5.2)  g/dL


 


Globulin   3.1   (2-4)  g/dL


 


Albumin/Globulin Ratio   1.5   (1-3)  


 


Lipase   12   (11.0-82.0)  U/L











Result Diagrams: 


 06/10/18 09:45





 06/10/18 09:45


Lab Statement: Any lab studies that have been ordered have been reviewed, and 

results considered in the medical decision making process.





Abdominal Pain Fem Course/Dx





- Course


Course Of Treatment: Pt. presenting with ongoing lower abdominal pain 2 weeks.

  He is afebrile with stable vital signs.  Patient had an MRI of his pelvis 

done 2 days ago is negative for acute findings other than a right inguinal 

hernia.  No palpable mass was noted on exam.  Patient was given a dose of IV 

morphine and Zofran with moderate relief of this pain.  Patient was examined by 

Dr. Mar as well.  He feels pain may be secondary to patient's back and 

sciatica given nature of pain.  Recommended giving IV Toradol and Decadron 

which mildly improved patient's symptoms.  Patient has an appointment with 

surgery tomorrow for further evaluation.  Did prescribe a few days of pain 

medication and nausea medication.   was queried and no red flags noted.  

Advised patient to apply warm compresses to low back.  Return to the ER 

symptoms change or worsen.  Patient understands and agrees with plan.





- Diagnoses


Differential Diagnosis/HQI/PQRI: Appendicitis, Constipation, Pancreatitis, 

Renal Colic, Ureteral Stone, Urinary Tract Infection


Provider Diagnoses: 


 Abdominal pain, Sciatica








Discharge





- Sign-Out/Discharge


Documenting (check all that apply): Discharge/Admit/Transfer





- Discharge Plan


Condition: Good


Disposition: HOME


Prescriptions: 


Hydrocodone/Acetaminophen [Hydrocodone-Acetamin 5-325 mg] 1 each PO Q6HR #12 

tablet MDD 4tablets


Ondansetron TAB* [Zofran 4 MG Tab*] 4 mg PO Q6H PRN #12 tab


 PRN Reason: Nausea


Patient Education Materials:  Sciatica (ED), Inguinal Hernia (ED)


Referrals: 


Dev Gutierrez MD [Primary Care Provider] - 


Additional Instructions: 


Follow up with surgery tomorrow as scheduled


Schedule an appointment with PCP as well


Apply warm compresses to low back


Pain medication as directed


Return to ER if symptoms change or worsen





- Billing Disposition and Condition


Condition: GOOD


Disposition: Home

## 2018-06-19 NOTE — OP
Operative Report - Blank





- Operative Report


Date of Operation: 06/19/18


Note: 





Brief Operative Note


Preop Dx: Right Inguinal Hernia


Postop Dx: same; direct


Procedure: Laparoscopic repair RIH w/ mesh


Anesthesia: GET


Surgeon: Nadeem


Assistant: CHAS Bains


Fluids: 1300 ml RL


EBL:  none


Specimen: none


Drains: none


Findings: dictated

## 2018-06-21 NOTE — OP
CC:  Dev Gutierrez MD *

 

DATE OF OPERATION:  06/19/18 - [facility]

 

DATE OF BIRTH:  06/24/52

 

SURGEON:  Floyd Silver MD

 

ASSISTANT:  CHAS Dai

 

ANESTHESIOLOGIST:  Kelvin Donato MD

 

ANESTHESIA:  General endotracheal.

 

PRE-OP DIAGNOSIS:  Right inguinal hernia.

 

POST-OP DIAGNOSIS:  Right inguinal hernia.

 

OPERATIVE PROCEDURE:  Laparoscopic preperitoneal repair, right inguinal hernia 
with mesh.

 

ESTIMATED BLOOD LOSS:  Minimal.

 

IV FLUIDS:  1.3 L of crystalloid.

 

SPECIMEN:  None.

 

DRAINS:  None.

 

COMPLICATIONS:  None.

 

COUNTS:  Instrument, needle, and sponge counts correct.

 

DESCRIPTION OF PROCEDURE:  The patient was brought to the operating room and 
placed on the table supine.  Sequential compression devices were placed in both 
lower extremities and general anesthesia was administered.  Poole catheter was 
placed. He was positioned and padded appropriately and prepped and draped in 
the usual sterile fashion.  He received appropriate antibiotics.  Time-out was 
performed.

 

Local anesthetic was infiltrated into the skin and soft tissue prior to making 
each incision.  A curvilinear infraumbilical incision was created and the 
subcutaneous tissue was divided with cautery.  The anterior rectus fascia was 
identified and incised transversely to the right of midline.  The underlying 
muscle was retracted laterally and a preperitoneal balloon dissector was placed 
down to the level of the pubic symphysis and then insufflated under direct 
visualization allowing dissection only on the right side.  This balloon 
dissector was then removed and replaced with a 12-mm blunt port and carbon 
dioxide was insufflated to a pressure of 15 mmHg. Under direct visualization, 
two 5 mm trocars were placed in the lower midline.  The dissection proceeded 
from the midline identifying the pubic symphysis and then working laterally 
identifying the Tu's ligament and the inferior epigastric vessels, which 
were anteriorly.  There appeared to be weakness in the area of the direct 
space.  Further laterally, the components of the spermatic cord were 
encountered and dissection revealed no extension of the peritoneal sac to 
within the inguinal region.  There was lipoma of the cord that was reduced.  
The dissection proceeded laterally to anterosuperior iliac spine.  After 
completing the dissection, repair of the hernia was performed using the 
[employer] ProGrip mesh, which was cut to fit the dissected space covering the 
direct, indirect, and femoral spaces and no fixation was used.  The ports were 
then removed under direct visualization and carbon dioxide was released.  The 
wounds were closed with 0 Vicryl to approximate the anterior fascia and skin 
incisions were closed with 4-0 Monocryl.  Steri-Strips were applied with dry 
dressings.  The patient tolerated this procedure well and was extubated and 
transferred to recovery room in stable condition.

 

 [id number]/CPS #: [id number]

MTDD